# Patient Record
Sex: FEMALE | Race: WHITE | NOT HISPANIC OR LATINO | URBAN - METROPOLITAN AREA
[De-identification: names, ages, dates, MRNs, and addresses within clinical notes are randomized per-mention and may not be internally consistent; named-entity substitution may affect disease eponyms.]

---

## 2017-05-25 ENCOUNTER — OFFICE VISIT - HEALTHEAST (OUTPATIENT)
Dept: FAMILY MEDICINE | Facility: CLINIC | Age: 37
End: 2017-05-25

## 2017-05-25 DIAGNOSIS — D64.9 ANEMIA: ICD-10-CM

## 2017-05-25 DIAGNOSIS — R76.8 POSITIVE ANA (ANTINUCLEAR ANTIBODY): ICD-10-CM

## 2017-05-25 DIAGNOSIS — Z83.49 FAMILY HISTORY OF THYROID DISEASE: ICD-10-CM

## 2017-05-25 DIAGNOSIS — R63.5 WEIGHT GAIN: ICD-10-CM

## 2017-05-25 ASSESSMENT — MIFFLIN-ST. JEOR: SCORE: 1422.04

## 2017-05-26 ENCOUNTER — AMBULATORY - HEALTHEAST (OUTPATIENT)
Dept: FAMILY MEDICINE | Facility: CLINIC | Age: 37
End: 2017-05-26

## 2017-05-26 DIAGNOSIS — E06.9 THYROIDITIS: ICD-10-CM

## 2020-09-01 ENCOUNTER — RECORDS - HEALTHEAST (OUTPATIENT)
Dept: ADMINISTRATIVE | Facility: OTHER | Age: 40
End: 2020-09-01

## 2020-09-02 ENCOUNTER — RECORDS - HEALTHEAST (OUTPATIENT)
Dept: ADMINISTRATIVE | Facility: OTHER | Age: 40
End: 2020-09-02

## 2020-09-04 ENCOUNTER — COMMUNICATION - HEALTHEAST (OUTPATIENT)
Dept: ADMINISTRATIVE | Facility: CLINIC | Age: 40
End: 2020-09-04

## 2021-05-31 VITALS — WEIGHT: 161 LBS | HEIGHT: 66 IN | BODY MASS INDEX: 25.88 KG/M2

## 2021-06-10 NOTE — PROGRESS NOTES
"  Assessment/Plan:     1. Weight gain  Discussed ongoing dietary changes.  Labs ordered per patient's request.  - Thyroid Stimulating Hormone (TSH)  - T4, Free  - T3 (Triiodothyronine), Free  - Thyroid Peroxidase Antibody  - Basic Metabolic Panel    2. Family history of thyroid disease  Full thyroid panel ordered per patient's request.  Also discussed ultrasound and benefits to imaging which she declines at this point may consider based on results of thyroid testing below.  - Thyroid Stimulating Hormone (TSH)  - T4, Free  - T3 (Triiodothyronine), Free  - Thyroid Peroxidase Antibody    3. Anemia  Her history but has since had ablation so suspect it to be better.  Will check hemoglobin and iron per patient's request further workup and recommendations based on results.  Does not currently take any iron supplementation but eats a lot of red meat and spinach.  - Hemoglobin  - Iron and Transferrin Iron Binding Capacity    4. Positive ONEIL (antinuclear antibody)  Per history.  Has previously seen a rheumatologist who requested no further workup.      Subjective:      Lexi Prescott is a 37 y.o. female comes in today as a new patient to establish care.  She was previously followed by the [facility] and [facility] as well as [facility].  She has signed records for release for review.  She states that she has a few concerns.  She requests her thyroid to be checked.  Requests full thyroid panel.  She states that she has had it checked several times before and thus far has been normal.  She states her father had thyroid issues starting around this age.  She is not entirely sure what he had but she said that it did have to be \"zapped\" and now takes Synthroid so I suspect he may have had some hyperthyroid.  She states that she does not believe it was thyroid cancer.  She states her father recommended that she get her thyroid checked yearly.  She has noticed a weight gain of 20 pounds in the last year.  States that the " previous year she had worked to lose that weight and it came back rather quickly.  She has not noticed any swelling in her thyroid.  No palpitations.  She also states a history of anemia.  States it was last checked 3 years ago.  At that time she was having very heavy irregular periods and underwent uterine ablation.  States she cannot tolerate iron supplementation due to the metallic taste in her mouth so she has a smoothie with a lot of spinach and it every day.  She also does eat meat.  Has not had any further bleeding since ablation.  No changes with bowels.  She states that she feels her energy is okay overall.  She also states that she did have a positive ONEIL.  We were able to review these records via care everywhere this was done in April 2014.  Had an appointment with rheumatologist but did not find it to be helpful.  She does not have any significant symptoms.  Okay to continue to monitor and does not request further workup at this time.  He believes that she had Pap smear about 3 years ago states it is always been normal.  We will get records to review.  No changes with her breasts.  She is a monogamous relationship  and declines STD screening.  She has a tubal ligation and her  has a vasectomy so there is no concerns for birth control.  No other new concerns today.    No current outpatient prescriptions on file.     No current facility-administered medications for this visit.        Past Medical History, Family History, and Social History reviewed.  History reviewed. No pertinent past medical history.  Past Surgical History:   Procedure Laterality Date     ENDOMETRIAL ABLATION       TUBAL LIGATION       Ibuprofen  Family History   Problem Relation Age of Onset     No Medical Problems Mother      Thyroid disease Father      Cancer Paternal Uncle      lung     Heart disease Neg Hx      Social History     Social History     Marital status:      Spouse name: N/A     Number of children:  "N/A     Years of education: N/A     Occupational History     Not on file.     Social History Main Topics     Smoking status: Never Smoker     Smokeless tobacco: Not on file     Alcohol use No     Drug use: No     Sexual activity: Yes     Partners: Male     Birth control/ protection: Surgical     Other Topics Concern     Not on file     Social History Narrative     No narrative on file         Review of systems is as stated in HPI, and the remainder of the 10 system review is otherwise negative.    Objective:     Vitals:    05/25/17 1123   BP: 116/66   Pulse: 66   Resp: 14   Weight: 161 lb (73 kg)   Height: 5' 6\" (1.676 m)    Body mass index is 25.99 kg/(m^2).    General Appearance:    Alert, cooperative, no distress, appears stated age   Head:    Normocephalic, without obvious abnormality, atraumatic   Eyes:    PERRL, EOM's intact   Ears:    Normal TM's and external ear canals   Nose:   Mucosa normal, no drainage     or sinus tenderness   Throat:   Oropharynx is clear   Neck:   Supple, symmetrical, no adenopathy, mild thyromegaly on the right side no distinct nodules felt.  Thyroid is nontender, no carotid bruit        Lungs:     Clear to auscultation bilaterally, respirations unlabored   Chest Wall:    No tenderness or deformity    Heart:    Regular rate and rhythm, S1 and S2 normal, no murmur, rub    or gallop       Abdomen:     Soft, non-tender, no CVA tenderness    no masses, no organomegaly           Extremities:   Extremities normal, atraumatic, no cyanosis or edema   Pulses:   2+ and symmetric all extremities   Skin:   No rashes or lesions         This note has been dictated using voice recognition software. Any grammatical or context distortions are unintentional and inherent to the the software.   "

## 2021-06-11 NOTE — TELEPHONE ENCOUNTER
[facility] [phone]  Referring Provider: Aga Aguilar CNP  DX: Lupus/multiple joint pain    Ref./rec. Were received on 9/2/2020 3:29:43 PM  In the rheum consult folder

## 2021-06-15 PROBLEM — Z83.49 FAMILY HISTORY OF THYROID DISEASE: Status: ACTIVE | Noted: 2017-05-25

## 2021-06-15 PROBLEM — R76.8 POSITIVE ANA (ANTINUCLEAR ANTIBODY): Status: ACTIVE | Noted: 2017-05-25

## 2021-06-15 PROBLEM — E06.9 THYROIDITIS: Status: ACTIVE | Noted: 2017-05-26

## 2021-06-26 ENCOUNTER — HEALTH MAINTENANCE LETTER (OUTPATIENT)
Age: 41
End: 2021-06-26

## 2021-10-16 ENCOUNTER — HEALTH MAINTENANCE LETTER (OUTPATIENT)
Age: 41
End: 2021-10-16

## 2022-07-17 ENCOUNTER — HEALTH MAINTENANCE LETTER (OUTPATIENT)
Age: 42
End: 2022-07-17

## 2022-09-25 ENCOUNTER — HEALTH MAINTENANCE LETTER (OUTPATIENT)
Age: 42
End: 2022-09-25

## 2023-08-06 ENCOUNTER — HEALTH MAINTENANCE LETTER (OUTPATIENT)
Age: 43
End: 2023-08-06

## 2024-03-02 ENCOUNTER — HEALTH MAINTENANCE LETTER (OUTPATIENT)
Age: 44
End: 2024-03-02